# Patient Record
Sex: MALE | Race: WHITE | NOT HISPANIC OR LATINO
[De-identification: names, ages, dates, MRNs, and addresses within clinical notes are randomized per-mention and may not be internally consistent; named-entity substitution may affect disease eponyms.]

---

## 2021-11-29 ENCOUNTER — APPOINTMENT (OUTPATIENT)
Dept: GASTROENTEROLOGY | Facility: CLINIC | Age: 56
End: 2021-11-29
Payer: COMMERCIAL

## 2021-11-29 VITALS — HEIGHT: 62 IN | WEIGHT: 142 LBS | HEART RATE: 70 BPM | RESPIRATION RATE: 12 BRPM | BODY MASS INDEX: 26.13 KG/M2

## 2021-11-29 VITALS — DIASTOLIC BLOOD PRESSURE: 60 MMHG | SYSTOLIC BLOOD PRESSURE: 120 MMHG

## 2021-11-29 DIAGNOSIS — Z82.69 FAMILY HISTORY OF OTHER DISEASES OF THE MUSCULOSKELETAL SYSTEM AND CONNECTIVE TISSUE: ICD-10-CM

## 2021-11-29 PROBLEM — Z00.00 ENCOUNTER FOR PREVENTIVE HEALTH EXAMINATION: Status: ACTIVE | Noted: 2021-11-29

## 2021-11-29 PROCEDURE — 99214 OFFICE O/P EST MOD 30 MIN: CPT

## 2021-11-29 RX ORDER — ATORVASTATIN CALCIUM 20 MG/1
20 TABLET, FILM COATED ORAL
Qty: 30 | Refills: 0 | Status: ACTIVE | COMMUNITY
Start: 2021-06-17

## 2021-11-29 NOTE — ASSESSMENT
[FreeTextEntry1] : 55-year-old male with hyperlipidemia with occasional regurgitation when he leans over due for colorectal cancer screening.  Possibilities include hiatal hernia versus esophagitis.  He is average risk for colorectal cancer screening.\par He was advised to undergo colonoscopy to which he  agreed. The procedure will be performed in [facility] with the assistance of an anesthesiologist. The procedure was explained in detail and he understood the risks of the procedure not limited  to infection, bleeding, perforation, or non-diagnosis of colon cancer. He  was advised that he could not drive home alone, if the patient chooses to receive sedation. Further diagnostic and treatment recommendations will be based upon the procedure and any biopsies, if they are taken.\par \par He  was advised to undergo endoscopy to which he agreed. The procedure will be performed in [facility] with the assistance of an anesthesiologist. The procedure was explained in detail and he understood the risks of the procedure not limited  to infection, bleeding, perforation, or non-diagnosis of esophageal or stomach cancer. He  was advised that he could not drive home alone, if the patient chooses to receive sedation. Further diagnostic and treatment recommendations will be based upon the procedure and any biopsies, if they are taken.\par \par

## 2021-11-29 NOTE — CONSULT LETTER
[Dear  ___] : Dear  [unfilled], [Consult Letter:] : I had the pleasure of evaluating your patient, [unfilled]. [Please see my note below.] : Please see my note below. [Consult Closing:] : Thank you very much for allowing me to participate in the care of this patient.  If you have any questions, please do not hesitate to contact me. [Sincerely,] : Sincerely, [FreeTextEntry3] : Sae Wheeler MD, FACP, AGAF, FAASLD\par  of Medicine\par [facility]\par

## 2021-11-29 NOTE — PHYSICAL EXAM
[General Appearance - Alert] : alert [General Appearance - In No Acute Distress] : in no acute distress [Sclera] : the sclera and conjunctiva were normal [PERRL With Normal Accommodation] : pupils were equal in size, round, and reactive to light [Extraocular Movements] : extraocular movements were intact [Outer Ear] : the ears and nose were normal in appearance [Oropharynx] : the oropharynx was normal [Neck Appearance] : the appearance of the neck was normal [Neck Cervical Mass (___cm)] : no neck mass was observed [Jugular Venous Distention Increased] : there was no jugular-venous distention [Thyroid Diffuse Enlargement] : the thyroid was not enlarged [Thyroid Nodule] : there were no palpable thyroid nodules [Auscultation Breath Sounds / Voice Sounds] : lungs were clear to auscultation bilaterally [Heart Rate And Rhythm] : heart rate was normal and rhythm regular [Heart Sounds] : normal S1 and S2 [Heart Sounds Gallop] : no gallops [Murmurs] : no murmurs [Heart Sounds Pericardial Friction Rub] : no pericardial rub [Full Pulse] : the pedal pulses are present [Edema] : there was no peripheral edema [Bowel Sounds] : normal bowel sounds [Abdomen Soft] : soft [Abdomen Tenderness] : non-tender [] : no hepato-splenomegaly [Abdomen Mass (___ Cm)] : no abdominal mass palpated [Cervical Lymph Nodes Enlarged Posterior Bilaterally] : posterior cervical [Cervical Lymph Nodes Enlarged Anterior Bilaterally] : anterior cervical [Supraclavicular Lymph Nodes Enlarged Bilaterally] : supraclavicular [Axillary Lymph Nodes Enlarged Bilaterally] : axillary [Femoral Lymph Nodes Enlarged Bilaterally] : femoral [Inguinal Lymph Nodes Enlarged Bilaterally] : inguinal [No CVA Tenderness] : no ~M costovertebral angle tenderness [No Spinal Tenderness] : no spinal tenderness [Abnormal Walk] : normal gait [Nail Clubbing] : no clubbing  or cyanosis of the fingernails [Musculoskeletal - Swelling] : no joint swelling seen [Motor Tone] : muscle strength and tone were normal [Deep Tendon Reflexes (DTR)] : deep tendon reflexes were 2+ and symmetric [Sensation] : the sensory exam was normal to light touch and pinprick [No Focal Deficits] : no focal deficits

## 2021-11-29 NOTE — HISTORY OF PRESENT ILLNESS
[Heartburn] : stable heartburn [Nausea] : resolved nausea [Vomiting] : resolved vomiting [Diarrhea] : resolved diarrhea [Constipation] : resolved constipation [Yellow Skin Or Eyes (Jaundice)] : resolved jaundice [Abdominal Pain] : resolved abdominal pain [Abdominal Swelling] : abdominal swelling resolved [Rectal Pain] : resolved rectal pain [GERD] : gastroesophageal reflux disease [Wt Gain ___ Lbs] : no recent weight gain [Wt Loss ___ Lbs] : no recent weight loss [Hiatus Hernia] : no hiatus hernia [Peptic Ulcer Disease] : no peptic ulcer disease [Pancreatitis] : no pancreatitis [Cholelithiasis] : no cholelithiasis [Kidney Stone] : no kidney stone [Inflammatory Bowel Disease] : no inflammatory bowel disease [Irritable Bowel Syndrome] : no irritable bowel syndrome [Diverticulitis] : no diverticulitis [Alcohol Abuse] : no alcohol abuse [Malignancy] : no malignancy [Abdominal Surgery] : no abdominal surgery [Appendectomy] : no appendectomy [Cholecystectomy] : no cholecystectomy [de-identified] : 55-year-old male no significant past medical history except for hyperlipidemia referred for colorectal cancer screening.  He has had a long history of occasional GERD.  There is no dysphagia nausea or vomiting.  He has occasional regurgitation when he leans over.

## 2021-12-15 ENCOUNTER — LABORATORY RESULT (OUTPATIENT)
Age: 56
End: 2021-12-15

## 2021-12-19 ENCOUNTER — RESULT REVIEW (OUTPATIENT)
Age: 56
End: 2021-12-19

## 2021-12-20 ENCOUNTER — APPOINTMENT (OUTPATIENT)
Dept: GASTROENTEROLOGY | Facility: AMBULATORY SURGERY CENTER | Age: 56
End: 2021-12-20
Payer: COMMERCIAL

## 2021-12-20 PROCEDURE — 43239 EGD BIOPSY SINGLE/MULTIPLE: CPT

## 2021-12-20 PROCEDURE — 45378 DIAGNOSTIC COLONOSCOPY: CPT

## 2021-12-20 RX ORDER — SODIUM SULFATE, MAGNESIUM SULFATE, AND POTASSIUM CHLORIDE 17.75; 2.7; 2.25 G/1; G/1; G/1
1479-225-188 TABLET ORAL
Qty: 24 | Refills: 0 | Status: DISCONTINUED | COMMUNITY
Start: 2021-11-29 | End: 2021-12-20

## 2022-01-13 ENCOUNTER — RX CHANGE (OUTPATIENT)
Age: 57
End: 2022-01-13

## 2022-01-13 RX ORDER — PANTOPRAZOLE 40 MG/1
40 TABLET, DELAYED RELEASE ORAL DAILY
Qty: 90 | Refills: 1 | Status: DISCONTINUED | COMMUNITY
Start: 2021-12-20 | End: 2022-01-13

## 2022-04-06 ENCOUNTER — TRANSCRIPTION ENCOUNTER (OUTPATIENT)
Age: 57
End: 2022-04-06

## 2022-10-14 ENCOUNTER — APPOINTMENT (OUTPATIENT)
Dept: GASTROENTEROLOGY | Facility: CLINIC | Age: 57
End: 2022-10-14

## 2022-10-14 VITALS
TEMPERATURE: 97.9 F | SYSTOLIC BLOOD PRESSURE: 102 MMHG | HEIGHT: 62 IN | WEIGHT: 134 LBS | OXYGEN SATURATION: 98 % | DIASTOLIC BLOOD PRESSURE: 68 MMHG | HEART RATE: 67 BPM | RESPIRATION RATE: 12 BRPM | BODY MASS INDEX: 24.66 KG/M2

## 2022-10-14 DIAGNOSIS — Z12.11 ENCOUNTER FOR SCREENING FOR MALIGNANT NEOPLASM OF COLON: ICD-10-CM

## 2022-10-14 PROCEDURE — 99214 OFFICE O/P EST MOD 30 MIN: CPT

## 2022-10-16 PROBLEM — Z12.11 COLON CANCER SCREENING: Status: ACTIVE | Noted: 2021-11-29

## 2022-10-16 NOTE — ASSESSMENT
[FreeTextEntry1] : \par 55 yo male, normal colonoscopy 2021, la grade  esophagitis 2021 for followup. Has occasional GERD, no weight loss or anorexia. No chronic cough.\par \par He has tried to d/c his ppi previously, unsuccessfully.\par \par We discussed possible rebound GERD, within 5 days of d/c pantoprazole . He had no hiatal hernia.\par \par IMP:\par  1. gerd\par 2. Hx of erosive esophagitis\par \par Plan:\par 1. reviewed life style changes wrt gerd\par 2. Reattempt at d/c pantoprazole, using Gaviscon prn\par 3. If not successful he will resume the PPI for 3 months and followup in my office.

## 2022-10-16 NOTE — PHYSICAL EXAM
[Alert] : alert [Normal Voice/Communication] : normal voice/communication [Healthy Appearing] : healthy appearing [No Acute Distress] : no acute distress [Sclera] : the sclera and conjunctiva were normal [Hearing Threshold Finger Rub Not [place]] : hearing was normal [Normal Lips/Gums] : the lips and gums were normal [Oropharynx] : the oropharynx was normal [No Neck Mass] : no neck mass was observed [Normal Appearance] : the appearance of the neck was normal [No Respiratory Distress] : no respiratory distress [No Acc Muscle Use] : no accessory muscle use [Respiration, Rhythm And Depth] : normal respiratory rhythm and effort [Auscultation Breath Sounds / Voice Sounds] : lungs were clear to auscultation bilaterally [Heart Rate And Rhythm] : heart rate was normal and rhythm regular [Normal S1, S2] : normal S1 and S2 [Murmurs] : no murmurs [Bowel Sounds] : normal bowel sounds [Abdomen Tenderness] : non-tender [No Masses] : no abdominal mass palpated [Abdomen Soft] : soft [] : no hepatosplenomegaly [Oriented To Time, Place, And Person] : oriented to person, place, and time

## 2022-10-16 NOTE — PHYSICAL EXAM
[Alert] : alert [Normal Voice/Communication] : normal voice/communication [Healthy Appearing] : healthy appearing [No Acute Distress] : no acute distress [Sclera] : the sclera and conjunctiva were normal [Hearing Threshold Finger Rub Not [place]] : hearing was normal [Normal Lips/Gums] : the lips and gums were normal [Oropharynx] : the oropharynx was normal [No Neck Mass] : no neck mass was observed [Normal Appearance] : the appearance of the neck was normal [No Respiratory Distress] : no respiratory distress [No Acc Muscle Use] : no accessory muscle use [Respiration, Rhythm And Depth] : normal respiratory rhythm and effort [Auscultation Breath Sounds / Voice Sounds] : lungs were clear to auscultation bilaterally [Heart Rate And Rhythm] : heart rate was normal and rhythm regular [Normal S1, S2] : normal S1 and S2 [Murmurs] : no murmurs [Bowel Sounds] : normal bowel sounds [Abdomen Tenderness] : non-tender [No Masses] : no abdominal mass palpated [Abdomen Soft] : soft [] : no hepatosplenomegaly [Oriented To Time, Place, And Person] : oriented to person, place, and time

## 2022-10-16 NOTE — ASSESSMENT
[FreeTextEntry1] : \par 57 yo male, normal colonoscopy 2021, la grade  esophagitis 2021 for followup. Has occasional GERD, no weight loss or anorexia. No chronic cough.\par \par He has tried to d/c his ppi previously, unsuccessfully.\par \par We discussed possible rebound GERD, within 5 days of d/c pantoprazole . He had no hiatal hernia.\par \par IMP:\par  1. gerd\par 2. Hx of erosive esophagitis\par \par Plan:\par 1. reviewed life style changes wrt gerd\par 2. Reattempt at d/c pantoprazole, using Gaviscon prn\par 3. If not successful he will resume the PPI for 3 months and followup in my office.

## 2022-10-16 NOTE — PHYSICAL EXAM
[Alert] : alert [Normal Voice/Communication] : normal voice/communication [Healthy Appearing] : healthy appearing [No Acute Distress] : no acute distress [Sclera] : the sclera and conjunctiva were normal [Hearing Threshold Finger Rub Not [place]] : hearing was normal [Normal Lips/Gums] : the lips and gums were normal [Oropharynx] : the oropharynx was normal [No Neck Mass] : no neck mass was observed [Normal Appearance] : the appearance of the neck was normal [No Respiratory Distress] : no respiratory distress [No Acc Muscle Use] : no accessory muscle use [Respiration, Rhythm And Depth] : normal respiratory rhythm and effort [Auscultation Breath Sounds / Voice Sounds] : lungs were clear to auscultation bilaterally [Heart Rate And Rhythm] : heart rate was normal and rhythm regular [Normal S1, S2] : normal S1 and S2 [Murmurs] : no murmurs [Bowel Sounds] : normal bowel sounds [Abdomen Tenderness] : non-tender [No Masses] : no abdominal mass palpated [Abdomen Soft] : soft [] : no hepatosplenomegaly [Oriented To Time, Place, And Person] : oriented to person, place, and time

## 2023-05-05 ENCOUNTER — APPOINTMENT (OUTPATIENT)
Dept: GASTROENTEROLOGY | Facility: CLINIC | Age: 58
End: 2023-05-05
Payer: COMMERCIAL

## 2023-05-05 VITALS
DIASTOLIC BLOOD PRESSURE: 70 MMHG | HEART RATE: 85 BPM | WEIGHT: 140 LBS | SYSTOLIC BLOOD PRESSURE: 106 MMHG | RESPIRATION RATE: 16 BRPM | OXYGEN SATURATION: 95 % | TEMPERATURE: 97.6 F | HEIGHT: 62 IN | BODY MASS INDEX: 25.76 KG/M2

## 2023-05-05 PROCEDURE — 99214 OFFICE O/P EST MOD 30 MIN: CPT

## 2023-05-05 RX ORDER — PANTOPRAZOLE 40 MG/1
40 TABLET, DELAYED RELEASE ORAL
Qty: 90 | Refills: 3 | Status: ACTIVE | COMMUNITY
Start: 2022-01-13 | End: 1900-01-01

## 2023-05-05 NOTE — ASSESSMENT
[FreeTextEntry1] : 57-year-old male, , normal colonoscopy 2021 and LA grade B esophagitis 2021 for follow-up.  He has been maintained on pantoprazole.  He tried to discontinue this but he states the GERD was "intolerable".  We reviewed the safety profile of PPIs in general and he agrees to continue with this.  He is adhering to lifestyle changes we discussed that his next endoscopy at the latest would be in 2026.  There is no dysphagia.  He remains on atorvastatin.\par \par IMP:\par 1. chronic gerd\par 2. HLD\par \par PLAN:\par 1. reviewed life-style changes for GERD\par 2. maintain pantoprazole 40 mg po qd\par 3. RTO 1 year

## 2023-05-05 NOTE — PHYSICAL EXAM

## 2024-05-10 ENCOUNTER — APPOINTMENT (OUTPATIENT)
Dept: GASTROENTEROLOGY | Facility: CLINIC | Age: 59
End: 2024-05-10
Payer: COMMERCIAL

## 2024-05-10 VITALS
BODY MASS INDEX: 26.5 KG/M2 | WEIGHT: 144 LBS | RESPIRATION RATE: 16 BRPM | HEART RATE: 89 BPM | SYSTOLIC BLOOD PRESSURE: 120 MMHG | TEMPERATURE: 98 F | OXYGEN SATURATION: 98 % | DIASTOLIC BLOOD PRESSURE: 78 MMHG | HEIGHT: 62 IN

## 2024-05-10 DIAGNOSIS — K22.10 ULCER OF ESOPHAGUS W/OUT BLEEDING: ICD-10-CM

## 2024-05-10 DIAGNOSIS — K21.9 GASTRO-ESOPHAGEAL REFLUX DISEASE W/OUT ESOPHAGITIS: ICD-10-CM

## 2024-05-10 DIAGNOSIS — Z86.39 PERSONAL HISTORY OF OTHER ENDOCRINE, NUTRITIONAL AND METABOLIC DISEASE: ICD-10-CM

## 2024-05-10 PROCEDURE — 99214 OFFICE O/P EST MOD 30 MIN: CPT

## 2024-05-10 RX ORDER — PANTOPRAZOLE 40 MG/1
40 TABLET, DELAYED RELEASE ORAL
Qty: 90 | Refills: 3 | Status: ACTIVE | COMMUNITY
Start: 2024-05-10 | End: 1900-01-01

## 2024-05-10 NOTE — PHYSICAL EXAM
[Alert] : alert [Normal Voice/Communication] : normal voice/communication [Healthy Appearing] : healthy appearing [No Acute Distress] : no acute distress [Sclera] : the sclera and conjunctiva were normal [Hearing Threshold Finger Rub Not [place]] : hearing was normal [Normal Lips/Gums] : the lips and gums were normal [Oropharynx] : the oropharynx was normal [Normal Appearance] : the appearance of the neck was normal [No Neck Mass] : no neck mass was observed [No Respiratory Distress] : no respiratory distress [No Acc Muscle Use] : no accessory muscle use [Auscultation Breath Sounds / Voice Sounds] : lungs were clear to auscultation bilaterally [Respiration, Rhythm And Depth] : normal respiratory rhythm and effort [Heart Rate And Rhythm] : heart rate was normal and rhythm regular [Normal S1, S2] : normal S1 and S2 [Murmurs] : no murmurs [Bowel Sounds] : normal bowel sounds [Abdomen Tenderness] : non-tender [No Masses] : no abdominal mass palpated [Abdomen Soft] : soft [] : no hepatosplenomegaly [Oriented To Time, Place, And Person] : oriented to person, place, and time

## 2025-05-09 ENCOUNTER — APPOINTMENT (OUTPATIENT)
Dept: GASTROENTEROLOGY | Facility: CLINIC | Age: 60
End: 2025-05-09
Payer: COMMERCIAL

## 2025-05-09 VITALS
BODY MASS INDEX: 26.87 KG/M2 | OXYGEN SATURATION: 99 % | WEIGHT: 146 LBS | DIASTOLIC BLOOD PRESSURE: 78 MMHG | RESPIRATION RATE: 16 BRPM | HEART RATE: 87 BPM | SYSTOLIC BLOOD PRESSURE: 120 MMHG | HEIGHT: 62 IN

## 2025-05-09 DIAGNOSIS — K22.10 ULCER OF ESOPHAGUS W/OUT BLEEDING: ICD-10-CM

## 2025-05-09 DIAGNOSIS — K21.9 GASTRO-ESOPHAGEAL REFLUX DISEASE W/OUT ESOPHAGITIS: ICD-10-CM

## 2025-05-09 DIAGNOSIS — Z86.39 PERSONAL HISTORY OF OTHER ENDOCRINE, NUTRITIONAL AND METABOLIC DISEASE: ICD-10-CM

## 2025-05-09 PROCEDURE — 99214 OFFICE O/P EST MOD 30 MIN: CPT

## 2025-05-09 RX ORDER — PANTOPRAZOLE 20 MG/1
20 TABLET, DELAYED RELEASE ORAL
Qty: 90 | Refills: 1 | Status: ACTIVE | COMMUNITY
Start: 2025-05-09 | End: 1900-01-01
